# Patient Record
Sex: FEMALE | Race: OTHER | HISPANIC OR LATINO
[De-identification: names, ages, dates, MRNs, and addresses within clinical notes are randomized per-mention and may not be internally consistent; named-entity substitution may affect disease eponyms.]

---

## 2023-11-17 PROBLEM — Z00.00 ENCOUNTER FOR PREVENTIVE HEALTH EXAMINATION: Status: ACTIVE | Noted: 2023-11-17

## 2023-11-18 ENCOUNTER — APPOINTMENT (OUTPATIENT)
Dept: MAMMOGRAPHY | Facility: IMAGING CENTER | Age: 48
End: 2023-11-18

## 2023-11-18 ENCOUNTER — APPOINTMENT (OUTPATIENT)
Dept: OBGYN | Facility: HOSPITAL | Age: 48
End: 2023-11-18

## 2024-01-06 ENCOUNTER — RESULT REVIEW (OUTPATIENT)
Age: 49
End: 2024-01-06

## 2024-01-06 ENCOUNTER — OUTPATIENT (OUTPATIENT)
Dept: OUTPATIENT SERVICES | Facility: HOSPITAL | Age: 49
LOS: 1 days | End: 2024-01-06
Payer: COMMERCIAL

## 2024-01-06 ENCOUNTER — OUTPATIENT (OUTPATIENT)
Dept: OUTPATIENT SERVICES | Facility: HOSPITAL | Age: 49
LOS: 1 days | End: 2024-01-06

## 2024-01-06 ENCOUNTER — APPOINTMENT (OUTPATIENT)
Dept: OBGYN | Facility: HOSPITAL | Age: 49
End: 2024-01-06

## 2024-01-06 ENCOUNTER — APPOINTMENT (OUTPATIENT)
Dept: MAMMOGRAPHY | Facility: IMAGING CENTER | Age: 49
End: 2024-01-06
Payer: COMMERCIAL

## 2024-01-06 DIAGNOSIS — Z12.39 ENCOUNTER FOR OTHER SCREENING FOR MALIGNANT NEOPLASM OF BREAST: ICD-10-CM

## 2024-01-06 DIAGNOSIS — Z00.8 ENCOUNTER FOR OTHER GENERAL EXAMINATION: ICD-10-CM

## 2024-01-06 DIAGNOSIS — Z11.51 ENCOUNTER FOR SCREENING FOR HUMAN PAPILLOMAVIRUS (HPV): ICD-10-CM

## 2024-01-06 DIAGNOSIS — Z12.4 ENCOUNTER FOR SCREENING FOR MALIGNANT NEOPLASM OF CERVIX: ICD-10-CM

## 2024-01-06 PROCEDURE — 77067 SCR MAMMO BI INCL CAD: CPT | Mod: 26

## 2024-01-06 PROCEDURE — 77063 BREAST TOMOSYNTHESIS BI: CPT | Mod: 26

## 2024-01-06 PROCEDURE — 77067 SCR MAMMO BI INCL CAD: CPT

## 2024-01-06 PROCEDURE — 77063 BREAST TOMOSYNTHESIS BI: CPT

## 2024-01-08 LAB
HPV HIGH+LOW RISK DNA PNL CVX: SIGNIFICANT CHANGE UP
HPV HIGH+LOW RISK DNA PNL CVX: SIGNIFICANT CHANGE UP

## 2024-01-09 LAB
CYTOLOGY SPEC DOC CYTO: SIGNIFICANT CHANGE UP
CYTOLOGY SPEC DOC CYTO: SIGNIFICANT CHANGE UP

## 2024-03-04 ENCOUNTER — EMERGENCY (EMERGENCY)
Facility: HOSPITAL | Age: 49
LOS: 1 days | Discharge: HOME | End: 2024-03-04
Attending: STUDENT IN AN ORGANIZED HEALTH CARE EDUCATION/TRAINING PROGRAM | Admitting: STUDENT IN AN ORGANIZED HEALTH CARE EDUCATION/TRAINING PROGRAM
Payer: SELF-PAY

## 2024-03-04 VITALS
DIASTOLIC BLOOD PRESSURE: 68 MMHG | OXYGEN SATURATION: 98 % | TEMPERATURE: 98 F | RESPIRATION RATE: 18 BRPM | HEART RATE: 68 BPM | SYSTOLIC BLOOD PRESSURE: 128 MMHG

## 2024-03-04 PROCEDURE — 99283 EMERGENCY DEPT VISIT LOW MDM: CPT

## 2024-03-04 RX ORDER — IBUPROFEN 200 MG
600 TABLET ORAL ONCE
Refills: 0 | Status: COMPLETED | OUTPATIENT
Start: 2024-03-04 | End: 2024-03-04

## 2024-03-04 RX ADMIN — Medication 600 MILLIGRAM(S): at 13:06

## 2024-03-04 NOTE — ED PROVIDER NOTE - PATIENT PORTAL LINK FT
You can access the FollowMyHealth Patient Portal offered by [facility] by registering at the following website: [email or web address]. By joining [employer]’s FollowMyHealth portal, you will also be able to view your health information using other applications (apps) compatible with our system.

## 2024-03-04 NOTE — ED ADULT NURSE NOTE - OBJECTIVE STATEMENT
Patient received wellness a&ox4, ambulatory. c/o back, upper dental pain, radiating to BL temple area x 2 months. pt denies injury or trauma, chest pain, sob, headache, dizziness. Breathing even, unlabored. Pt medicated as per MAR. Safety maintained.

## 2024-03-04 NOTE — ED PROVIDER NOTE - CLINICAL SUMMARY MEDICAL DECISION MAKING FREE TEXT BOX
Patient with several months of dental pain without acute change of symptoms.  Patient reporting relief of pain with Tylenol and Motrin.  Appropriate medication dosing and frequency discussed.  Will give information for dental clinic.  Strict return precaution discussed with patient at bedside.  Stable for DC.

## 2024-03-04 NOTE — ED PROVIDER NOTE - OBJECTIVE STATEMENT
rian [id number] .  48-year-old female no significant past medical history presents to ED for several months of dental pain.  He reports multiple sites of pain right and left lower jaw left upper jaw.  No clear onset of symptoms.  Patient denies facial swelling, fevers, chills, difficulty tolerating p.o.  Denies shortness of breath.  Patient does report does report pain worse with cold foods.  Denies any chest pain, shortness of breath, nausea, vomiting.    Patient reports taking 5 mg acetaminophen every 4 hours and Motrin 600 mg every 4 hours.

## 2024-03-04 NOTE — ED PROVIDER NOTE - PHYSICAL EXAMINATION
GEN: no acute respiratory distress. nontoxic, speaking comfortably in full sentences, ambulating with steady gait.  HEENT: NCAT. face symmetrical. No facial swelling .PERRL 4mm, EOMI, MMM, oropharynx wnl.No digital swelling.  Multiple teeth with poor dentition.  Left upper premolar with chronic appearing dental fracture.  No tenderness to teeth precaution.  Teeth without increased mobility.  Neck: no JVD, trachea midline, no lymphadenopathy, FROM. No stridor.  CV: RRR. +S1S2, no murmur. 2+ pulses in 4 extremities, cap refill <2 sec  Chest: CTA B/l. no wheezing, rales, rhonchi. no retractions. good air movement.   ABD: +BS, soft, non distended, non tender.   MSK: No clubbing, cyanosis, edema. FROM of all extremities.   Neuro: AAOX3.  Sensation and motor grossly intact  SKIN: No erythema, lesions or rash

## 2024-03-04 NOTE — ED PROVIDER NOTE - NSFOLLOWUPINSTRUCTIONS_ED_ALL_ED_FT
English
1) Please follow-up in Dental clinic within the next 7 days.  Please call today for an appointment.   2) If you have any worsening of symptoms or any other concerns please return to the ED immediately.  3) Please take the medication you were prescribed today and continue taking your home medications as directed.  4) You may have been given a copy of your labs and/or imaging.  Please go over these with your primary care doctor.    Dental Pain    El dolor dental es a menudo un signo de que sucede algo en los dientes o las encías. También es algo que puede ocurrir después de un tratamiento dental. Si tiene dolor dental, es importante que se ponga en contacto con boyce dentista, especialmente si no se ha determinado la causa del dolor. La intensidad del dolor dental puede variar y amita causas pueden ser muchas, entre ellas, las siguientes:  Caries. Las caries son causadas por bacterias que producen ácidos que irritan el nervio del diente, volviéndolo sensible al aire y a las temperaturas altas o bajas. Kenel con el tiempo provoca molestias o dolor.  Infección o absceso. Rahel vez que las bacterias llegan a la parte interna del diente (pulpa), puede producirse rahel infección bacteriana (absceso dental). El pus suele acumularse en el extremo de la raíz de un diente.  Lesiones.  Rahel grieta en el diente.  Retracción de las encías que expone la raíz y posiblemente los nervios de un diente.  Enfermedad en las encías (periodontal).  Apretar o rechinar los dientes de forma anormal.  Cuidado deficiente o inadecuado en el hogar.  Rahel razón desconocida (idiopática).  El dolor puede ser leve o intenso. Puede ocurrir cuando usted:  [place].  Está expuesto a temperaturas calientes o frías.  Come alimentos o jordi bebidas con azúcar, por ejemplo, gaseosas o caramelos.  El dolor puede ser miller, o puede aparecer y desaparecer sin ninguna causa.    Siga estas instrucciones en boyce casa:  Las siguientes medidas pueden servir para aliviar cualquier molestia que esté sintiendo antes o después de recibir atención dental.    Medicamentos    [place] los medicamentos de venta johnny y los recetados solamente mayo se lo haya indicado el dentista.  Si le recetaron un antibiótico, tómelo mayo se lo haya indicado el dentista. No deje de jordi los antibióticos aunque comience a sentirse mejor.  Comida y bebida    Evite los alimentos o las bebidas que le causen dolor, mayo los siguientes:  Alimentos o bebidas muy calientes o muy fríos.  Alimentos o bebidas dulces o con azúcar.  Control del dolor y la hinchazón      El hielo a veces se puede usar para reducir el dolor y la hinchazón, especialmente si el dolor aparece después de un tratamiento dental.  Si se lo indican, aplique hielo sobre la jose dolorida de la alexy. Para hacer esto:  Ponga el hielo en rahel bolsa plástica.  Coloque rahel toalla entre la piel y la bolsa.  Aplique el hielo kit 20 minutos, 2 o 3 veces por día.  Retire el hielo si la piel se pone de color sweet brillante. Kenel es muy importante. Si no puede sentir dolor, calor o frío, tiene un mayor riesgo de que se dañe la jose.  Cepillarse los dientes    Para mantener la boca y las encías sanas, cepíllese los dientes dos veces por día con rahel pasta dental con flúor.  Use rahel pasta dental para dientes sensibles mayo se lo haya indicado el dentista, especialmente si la raíz está expuesta.  Cepíllese siempre los dientes con un cepillo de cerdas suaves. Kenel ayudará a evitar la irritación de las encías.  Instrucciones generales    Use hilo dental al menos rahel vez al día.  No se aplique calor en la parte externa de la alexy.  Marisa gárgaras con rahel mezcla de agua y sal 3 o 4 veces al día, o según sea necesario. Para preparar agua con sal, disuelva totalmente de ½ a 1 cucharadita (de 3 a 6 g) de sal en 1 taza (237 ml) de agua tibia.  Concurra a todas las visitas de seguimiento. Kenel es importante.  Comuníquese con un dentista si:  Padece algún dolor dental inexplicable.  El dolor no se kory con los medicamentos.  Amita síntomas empeoran.  Aparecen nuevos síntomas.  Solicite ayuda de inmediato si:  No puede abrir la boca.  Tiene problemas para respirar o tragar.  Tiene fiebre.  Observa que tiene hinchazón en la alexy, el ainsley o la mandíbula.  Estos síntomas pueden representar un problema grave que constituye rahel emergencia. No espere a shi si los síntomas desaparecen. Solicite atención médica de inmediato. Comuníquese con el servicio de emergencias de boyce localidad (911 en los [place]). No conduzca por amita propios medios hasta el hospital.    Resumen  Las causas del dolor dental pueden ser muchas, entre ellas, rahel caries y rahel infección.  El dolor puede ser leve o intenso.  [place] los medicamentos de venta johnny y los recetados solamente mayo se lo haya indicado el dentista.  Controle el dolor dental a fin de detectar algún cambio. Informe a boyce dentista si los síntomas empeoran.  Esta información no tiene mayo fin reemplazar el consejo del médico. Asegúrese de hacerle al médico cualquier pregunta que tenga.    Dental Pain    Dental pain is often a sign that something is wrong with your teeth or gums. It is also something that can occur following dental treatment. If you have dental pain, it is important to contact your dental care provider, especially if the cause of the pain has not been determined. Dental pain may be of varying intensity and can be caused by many things, including:  Tooth decay (cavities or caries). Cavities are caused by bacteria that produce acids that irritate the nerve of your tooth, making it sensitive to air and hot or cold temperatures. This eventually causes discomfort or pain.  Abscess or infection. Once the bacteria reach the inner part of the tooth (pulp), a bacterial infection (dental abscess) can occur. Pus typically collects at the end of the root of a tooth.  Injury.  A crack in the tooth.  Gum recession exposing the root, and possibly the nerves, of a tooth.  Gum (periodontal)disease.  Abnormal grinding or clenching.  Poor or improper home care.  An unknown reason (idiopathic).  Your pain may be mild or severe. It may occur when you are:  Chewing.  Exposed to hot or cold temperatures.  Eating or drinking sugary foods or beverages, such as soda or candy.  Your pain may be constant, or it may come and go without cause.    Follow these instructions at home:  The following actions may help to lessen any discomfort that you are feeling before or after getting dental care.    Medicines    Take over-the-counter and prescription medicines only as told by your dental care provider.  If you were prescribed an antibiotic medicine, take it as told by your dental care provider. Do not stop taking the antibiotic even if you start to feel better.  Eating and drinking    Avoid foods or drinks that cause you pain, such as:  Very hot or very cold foods or drinks.  Sweet or sugary foods or drinks.  Managing pain and swelling      Ice can sometimes be used to reduce pain and swelling, especially if the pain is following dental treatment.  If directed, put ice on the painful area of your face. To do this:  Put ice in a plastic bag.  Place a towel between your skin and the bag.  Leave the ice on for 20 minutes, 2–3 times a day.  Remove the ice if your skin turns bright red. This is very important. If you cannot feel pain, heat, or cold, you have a greater risk of damage to the area.  Brushing your teeth    To keep your mouth and gums healthy, brush your teeth twice a day using a fluoride toothpaste.  Use a toothpaste made for sensitive teeth as directed by your dental care provider, especially if the root is exposed.  Always brush your teeth with a soft-bristled toothbrush. This will help prevent irritation to your gums.  General instructions    Floss at least once a day.  Do not apply heat to the outside of the face.  Gargle with a mixture of salt and water 3–4 times a day or as needed. To make salt water, completely dissolve ½–1 tsp (3–6 g) of salt in 1 cup (237 mL) of warm water.  Keep all follow-up visits. This is important.  Contact a dental care provider if:  You have any unexplained dental pain.  Your pain is not controlled with medicines.  Your symptoms get worse.  You have new symptoms.  Get help right away if:  You are unable to open your mouth.  You are having trouble breathing or swallowing.  You have a fever.  You notice that your face, neck, or jaw is swollen.  These symptoms may represent a serious problem that is an emergency. Do not wait to see if the symptoms will go away. Get medical help right away. Call your local emergency services (911 in the [place] Do not drive yourself to the hospital.    Summary  Dental pain may be caused by many things, including tooth decay and infection.  Your pain may be mild or severe.  Take over-the-counter and prescription medicines only as told by your dental care provider.  Watch your dental pain for any changes. Let your dental care provider know if your symptoms get worse.  This information is not intended to replace advice given to you by your health care provider. Make sure you discuss any questions you have with your health care provider.

## 2024-03-27 ENCOUNTER — APPOINTMENT (OUTPATIENT)
Age: 49
End: 2024-03-27
Payer: COMMERCIAL

## 2024-03-27 PROCEDURE — D7140: CPT

## 2024-04-05 ENCOUNTER — APPOINTMENT (OUTPATIENT)
Age: 49
End: 2024-04-05
Payer: COMMERCIAL

## 2024-04-05 PROCEDURE — D0140: CPT

## 2024-04-05 PROCEDURE — D0330 PANORAMIC RADIOGRAPHIC IMAGE: CPT

## 2024-04-05 PROCEDURE — D7140: CPT

## 2024-04-05 PROCEDURE — D0230: CPT

## 2024-04-05 PROCEDURE — D0220: CPT

## 2024-05-06 ENCOUNTER — APPOINTMENT (OUTPATIENT)
Dept: INTERNAL MEDICINE | Facility: CLINIC | Age: 49
End: 2024-05-06
Payer: COMMERCIAL

## 2024-05-06 ENCOUNTER — OUTPATIENT (OUTPATIENT)
Dept: OUTPATIENT SERVICES | Facility: HOSPITAL | Age: 49
LOS: 1 days | End: 2024-05-06

## 2024-05-06 ENCOUNTER — MED ADMIN CHARGE (OUTPATIENT)
Age: 49
End: 2024-05-06

## 2024-05-06 VITALS
SYSTOLIC BLOOD PRESSURE: 102 MMHG | BODY MASS INDEX: 26.41 KG/M2 | HEIGHT: 59 IN | HEART RATE: 70 BPM | WEIGHT: 131 LBS | RESPIRATION RATE: 16 BRPM | OXYGEN SATURATION: 98 % | DIASTOLIC BLOOD PRESSURE: 58 MMHG

## 2024-05-06 DIAGNOSIS — R63.4 ABNORMAL WEIGHT LOSS: ICD-10-CM

## 2024-05-06 DIAGNOSIS — M26.609 UNSPECIFIED TEMPOROMANDIBULAR JOINT DISORDER: ICD-10-CM

## 2024-05-06 DIAGNOSIS — L65.9 NONSCARRING HAIR LOSS, UNSPECIFIED: ICD-10-CM

## 2024-05-06 DIAGNOSIS — Z00.00 ENCOUNTER FOR GENERAL ADULT MEDICAL EXAMINATION W/OUT ABNORMAL FINDINGS: ICD-10-CM

## 2024-05-06 DIAGNOSIS — Z23 ENCOUNTER FOR IMMUNIZATION: ICD-10-CM

## 2024-05-06 DIAGNOSIS — F32.A DEPRESSION, UNSPECIFIED: ICD-10-CM

## 2024-05-06 DIAGNOSIS — R51.9 HEADACHE, UNSPECIFIED: ICD-10-CM

## 2024-05-06 PROCEDURE — 99204 OFFICE O/P NEW MOD 45 MIN: CPT | Mod: 25

## 2024-05-07 ENCOUNTER — NON-APPOINTMENT (OUTPATIENT)
Age: 49
End: 2024-05-07

## 2024-05-08 ENCOUNTER — APPOINTMENT (OUTPATIENT)
Dept: INTERNAL MEDICINE | Facility: CLINIC | Age: 49
End: 2024-05-08

## 2024-05-08 DIAGNOSIS — R63.4 ABNORMAL WEIGHT LOSS: ICD-10-CM

## 2024-05-08 DIAGNOSIS — M26.609 UNSPECIFIED TEMPOROMANDIBULAR JOINT DISORDER, UNSPECIFIED SIDE: ICD-10-CM

## 2024-05-08 DIAGNOSIS — F32.A DEPRESSION, UNSPECIFIED: ICD-10-CM

## 2024-05-08 DIAGNOSIS — R51.9 HEADACHE, UNSPECIFIED: ICD-10-CM

## 2024-05-08 DIAGNOSIS — L65.9 NONSCARRING HAIR LOSS, UNSPECIFIED: ICD-10-CM

## 2024-05-08 DIAGNOSIS — Z00.00 ENCOUNTER FOR GENERAL ADULT MEDICAL EXAMINATION WITHOUT ABNORMAL FINDINGS: ICD-10-CM
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
## 2024-05-08 NOTE — ASSESSMENT
[FreeTextEntry1] : 48F no known pmhx presents to establish care w/ headaches found to have significant stressors and positive depression screen. Continue management of stressors, social work follow up.   D/w Dr. Jolley  RTC in 10 weeks  [facility] PGY-1 [employer] 2
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
## 2024-05-08 NOTE — PHYSICAL EXAM
[No Acute Distress] : no acute distress [Normal Sclera/Conjunctiva] : normal sclera/conjunctiva [Normal Outer Ear/Nose] : the outer ears and nose were normal in appearance [Normal TMs] : both tympanic membranes were normal [No JVD] : no jugular venous distention [No Lymphadenopathy] : no lymphadenopathy [Thyroid Normal, No Nodules] : the thyroid was normal and there were no nodules present [Normal] : normal rate, regular rhythm, normal S1 and S2 and no murmur heard [No Edema] : there was no peripheral edema [Soft] : abdomen soft [Non Tender] : non-tender [Non-distended] : non-distended [No Rash] : no rash [de-identified] : no spinal or paraspinal tenderness [de-identified] : mood is depressed

## 2024-05-08 NOTE — HEALTH RISK ASSESSMENT
[Patient reported mammogram was normal] : Patient reported mammogram was normal [Patient reported PAP Smear was normal] : Patient reported PAP Smear was normal [Fair] : ~his/her~ current health as fair  [1 or 2 (0 pts)] : 1 or 2 (0 points) [Never (0 pts)] : Never (0 points) [No] : In the past 12 months have you used drugs other than those required for medical reasons? No [No falls in past year] : Patient reported no falls in the past year [1] : 1) Little interest or pleasure doing things for several days (1) [3] : 2) Feeling down, depressed, or hopeless for nearly every day (3) [Nearly Every Day (3)] : 2.) Feeling down, depressed or hopeless? Nearly every day [Several Days (1)] : 8.) Moving or speaking so slowly that other people could have noticed, or the opposite, moving or speaking faster than usual? Several days [Not at All (0)] : 9.) Thoughts that you would be off dead or of hurting yourself in some way? Not at all [Mild] : Severity of Depression is Mild [PHQ-9 Positive] : PHQ-9 Positive [Fully functional (bathing, dressing, toileting, transferring, walking, feeding)] : Fully functional (bathing, dressing, toileting, transferring, walking, feeding) [Fully functional (using the telephone, shopping, preparing meals, housekeeping, doing laundry, using] : Fully functional and needs no help or supervision to perform IADLs (using the telephone, shopping, preparing meals, housekeeping, doing laundry, using transportation, managing medications and managing finances) [Reports changes in hearing] : Reports changes in hearing [Reports changes in dental health] : Reports changes in dental health [Never] : Never [FreeTextEntry1] : has headaches [Audit-CScore] : 0 [[id number]] : 4 [[id number]] : 8 [Reports changes in vision] : Reports no changes in vision [MammogramDate] : 01/24 [PapSmearDate] : 01/24 [ColonoscopyComments] : FOBT 1/2024 negative [de-identified] : endorses R sided hearing changes [de-identified] : toothpain

## 2024-05-09 DIAGNOSIS — R73.03 PREDIABETES.: ICD-10-CM

## 2024-05-09 DIAGNOSIS — E78.5 HYPERLIPIDEMIA, UNSPECIFIED: ICD-10-CM

## 2024-05-09 LAB
ALBUMIN SERPL ELPH-MCNC: 4.6 G/DL
ALP BLD-CCNC: 79 U/L
ALT SERPL-CCNC: 35 U/L
ANION GAP SERPL CALC-SCNC: 10 MMOL/L
AST SERPL-CCNC: 26 U/L
BILIRUB SERPL-MCNC: 0.2 MG/DL
BUN SERPL-MCNC: 16 MG/DL
CALCIUM SERPL-MCNC: 9.5 MG/DL
CHLORIDE SERPL-SCNC: 104 MMOL/L
CHOLEST SERPL-MCNC: 209 MG/DL
CO2 SERPL-SCNC: 22 MMOL/L
CREAT SERPL-MCNC: 0.55 MG/DL
EGFR: 113 ML/MIN/1.73M2
ESTIMATED AVERAGE GLUCOSE: 120 MG/DL
GLUCOSE SERPL-MCNC: 94 MG/DL
HBA1C MFR BLD HPLC: 5.8 %
HBV SURFACE AB SER QL: NONREACTIVE
HCV AB SER QL: NONREACTIVE
HCV S/CO RATIO: 0.13 S/CO
HDLC SERPL-MCNC: 53 MG/DL
HIV1+2 AB SPEC QL IA.RAPID: NONREACTIVE
LDLC SERPL CALC-MCNC: 128 MG/DL
NONHDLC SERPL-MCNC: 156 MG/DL
POTASSIUM SERPL-SCNC: 4 MMOL/L
PROT SERPL-MCNC: 7.5 G/DL
SODIUM SERPL-SCNC: 137 MMOL/L
TRIGL SERPL-MCNC: 161 MG/DL
TSH SERPL-ACNC: 1.98 UIU/ML

## 2024-07-23 ENCOUNTER — NON-APPOINTMENT (OUTPATIENT)
Age: 49
End: 2024-07-23

## 2024-07-25 ENCOUNTER — NON-APPOINTMENT (OUTPATIENT)
Age: 49
End: 2024-07-25

## 2025-02-22 ENCOUNTER — APPOINTMENT (OUTPATIENT)
Dept: MAMMOGRAPHY | Facility: IMAGING CENTER | Age: 50
End: 2025-02-22

## 2025-04-12 ENCOUNTER — RESULT REVIEW (OUTPATIENT)
Age: 50
End: 2025-04-12

## 2025-04-12 ENCOUNTER — OUTPATIENT (OUTPATIENT)
Dept: OUTPATIENT SERVICES | Facility: HOSPITAL | Age: 50
LOS: 1 days | End: 2025-04-12
Payer: COMMERCIAL

## 2025-04-12 ENCOUNTER — APPOINTMENT (OUTPATIENT)
Dept: OBGYN | Facility: HOSPITAL | Age: 50
End: 2025-04-12

## 2025-04-12 ENCOUNTER — OUTPATIENT (OUTPATIENT)
Dept: OUTPATIENT SERVICES | Facility: HOSPITAL | Age: 50
LOS: 1 days | End: 2025-04-12

## 2025-04-12 ENCOUNTER — APPOINTMENT (OUTPATIENT)
Dept: MAMMOGRAPHY | Facility: IMAGING CENTER | Age: 50
End: 2025-04-12
Payer: COMMERCIAL

## 2025-04-12 DIAGNOSIS — Z12.39 ENCOUNTER FOR OTHER SCREENING FOR MALIGNANT NEOPLASM OF BREAST: ICD-10-CM

## 2025-04-12 DIAGNOSIS — Z00.8 ENCOUNTER FOR OTHER GENERAL EXAMINATION: ICD-10-CM

## 2025-04-12 PROCEDURE — 77067 SCR MAMMO BI INCL CAD: CPT

## 2025-04-12 PROCEDURE — 77063 BREAST TOMOSYNTHESIS BI: CPT

## 2025-04-12 PROCEDURE — 77063 BREAST TOMOSYNTHESIS BI: CPT | Mod: 26

## 2025-04-12 PROCEDURE — 77067 SCR MAMMO BI INCL CAD: CPT | Mod: 26

## 2025-08-29 ENCOUNTER — APPOINTMENT (OUTPATIENT)
Age: 50
End: 2025-08-29

## 2025-09-03 ENCOUNTER — APPOINTMENT (OUTPATIENT)
Age: 50
End: 2025-09-03
Payer: COMMERCIAL

## 2025-09-03 PROCEDURE — D7140: CPT

## 2025-09-03 PROCEDURE — D0140: CPT

## 2025-09-03 PROCEDURE — D0220: CPT

## 2025-09-03 PROCEDURE — D0230: CPT

## 2025-09-10 ENCOUNTER — APPOINTMENT (OUTPATIENT)
Age: 50
End: 2025-09-10